# Patient Record
Sex: FEMALE | Race: WHITE | NOT HISPANIC OR LATINO
[De-identification: names, ages, dates, MRNs, and addresses within clinical notes are randomized per-mention and may not be internally consistent; named-entity substitution may affect disease eponyms.]

---

## 2022-04-19 PROBLEM — Z00.00 ENCOUNTER FOR PREVENTIVE HEALTH EXAMINATION: Status: ACTIVE | Noted: 2022-04-19

## 2022-05-04 ENCOUNTER — APPOINTMENT (OUTPATIENT)
Dept: OBGYN | Facility: CLINIC | Age: 42
End: 2022-05-04
Payer: MEDICAID

## 2022-05-04 ENCOUNTER — RESULT CHARGE (OUTPATIENT)
Age: 42
End: 2022-05-04

## 2022-05-04 VITALS — HEIGHT: 63 IN | BODY MASS INDEX: 24.8 KG/M2 | TEMPERATURE: 98 F | WEIGHT: 140 LBS

## 2022-05-04 VITALS — DIASTOLIC BLOOD PRESSURE: 60 MMHG | SYSTOLIC BLOOD PRESSURE: 104 MMHG

## 2022-05-04 DIAGNOSIS — Z78.9 OTHER SPECIFIED HEALTH STATUS: ICD-10-CM

## 2022-05-04 LAB
BILIRUB UR QL STRIP: NEGATIVE
GLUCOSE UR-MCNC: NEGATIVE
HCG UR QL: 0.2 EU/DL
HGB UR QL STRIP.AUTO: NORMAL
KETONES UR-MCNC: NORMAL
LEUKOCYTE ESTERASE UR QL STRIP: NORMAL
NITRITE UR QL STRIP: NEGATIVE
PH UR STRIP: 6
PROT UR STRIP-MCNC: NEGATIVE
SP GR UR STRIP: 1.03

## 2022-05-04 PROCEDURE — 99386 PREV VISIT NEW AGE 40-64: CPT

## 2022-05-04 NOTE — DISCUSSION/SUMMARY
[FreeTextEntry1] : 42 yo  LMP 22 (approximate) with right breast mass and small amount of discharge on exam\par - Pap and HPV collected\par - Vaginitis panel collected for patient's complaint of increased vaginal discharge\par - Patient already has breast ultrasound referral from her PCP, has appointment with regional radiology. Advised her to have screening mammogram as well, referral given.\par - Discussed contraception options, patient declined\par - Next annual in 1 year

## 2022-05-04 NOTE — PHYSICAL EXAM
[Chaperone Declined] : Patient declined chaperone [Appropriately responsive] : appropriately responsive [Alert] : alert [No Acute Distress] : no acute distress [Soft] : soft [Non-tender] : non-tender [Non-distended] : non-distended [No Lesions] : no lesions [No Mass] : no mass [Oriented x3] : oriented x3 [FreeTextEntry6] : Right breast with palpable mass at 11 oclock, 3-4 cm from her areola, poorly defined, freely mobile, nontender. Breasts otherwise normal bilaterally. [Labia Majora] : normal [Labia Minora] : normal [Scant] : scant [Discharge] : a  ~M vaginal discharge was present [White] : white [Thin] : thin [No Bleeding] : There was no active vaginal bleeding [Normal] : normal [Tenderness] : nontender [Enlarged ___ wks] : not enlarged [Anteversion] : anteverted [Uterine Adnexae] : normal

## 2022-05-04 NOTE — HISTORY OF PRESENT ILLNESS
[FreeTextEntry1] : 40 yo  LMP 22 (approximate) presents to establish gyn care and discuss right breast mass. She noticed the mass 2-3 weeks ago, top of her right breast, was tender during her last period, now is nontender and feels like it moved slightly over the last 2 weeks. She denies nipple discharge or bleeding. She has a referral for a diagnostic breast ultrasound from her PCP.\par \par She denies abdominal pain, pelvic pain, abnormal vaginal bleeding. She has noticed increased vaginal discharge over the last few weeks. She does not have vaginal itching or burning.\par \par OB:  full term  x2\par GYN: Denies h/o fibroids, cysts, abnormal paps, STIs. Last saw gyn approximately 10 years ago, had normal annual visits after her children were born. never had mammogram. Takes probiotics regularly\par PMH: Denies\par PSH: Denies\par Meds: none\par

## 2022-05-06 LAB — HPV HIGH+LOW RISK DNA PNL CVX: NOT DETECTED

## 2022-05-12 LAB — CYTOLOGY CVX/VAG DOC THIN PREP: NORMAL

## 2022-06-28 ENCOUNTER — NON-APPOINTMENT (OUTPATIENT)
Age: 42
End: 2022-06-28

## 2023-02-28 ENCOUNTER — NON-APPOINTMENT (OUTPATIENT)
Age: 43
End: 2023-02-28

## 2023-04-05 ENCOUNTER — APPOINTMENT (OUTPATIENT)
Dept: OBGYN | Facility: CLINIC | Age: 43
End: 2023-04-05
Payer: MEDICAID

## 2023-04-05 VITALS — HEIGHT: 64 IN | BODY MASS INDEX: 23.9 KG/M2 | WEIGHT: 140 LBS

## 2023-04-05 PROCEDURE — 99213 OFFICE O/P EST LOW 20 MIN: CPT

## 2023-04-05 NOTE — HISTORY OF PRESENT ILLNESS
[FreeTextEntry1] : 41 yo presents with vaginal discomfort. Reports shaving 3 days and experiencing pain immediately after. Started feeling "bumps" by her labia and was concerned. Reports bumps are smaller and less painful now. No fever, chills. Reports always having more than normal amount of clear discharge which is bothersome.

## 2023-04-05 NOTE — DISCUSSION/SUMMARY
[FreeTextEntry1] : 43 yo with small labial cyst and irritation. \par \par - f/up vagintiis\par - discussed some people have higher than average amount of physiologic discharge, nothing to do\par - as small cyst is improving per patient, nothing to do. Would recommend soaking area with warm water and avoid using feminine hygiene products and shaving in the near futur. Just wash with unscented soap and water\par - can take daily probiotic if desired\par - appt next month for annual exam

## 2023-05-10 ENCOUNTER — APPOINTMENT (OUTPATIENT)
Dept: OBGYN | Facility: CLINIC | Age: 43
End: 2023-05-10
Payer: MEDICAID

## 2023-05-10 VITALS — DIASTOLIC BLOOD PRESSURE: 70 MMHG | SYSTOLIC BLOOD PRESSURE: 110 MMHG

## 2023-05-10 VITALS — HEIGHT: 64 IN | BODY MASS INDEX: 24.24 KG/M2 | WEIGHT: 142 LBS

## 2023-05-10 DIAGNOSIS — Z87.42 PERSONAL HISTORY OF OTHER DISEASES OF THE FEMALE GENITAL TRACT: ICD-10-CM

## 2023-05-10 DIAGNOSIS — N90.89 OTHER SPECIFIED NONINFLAMMATORY DISORDERS OF VULVA AND PERINEUM: ICD-10-CM

## 2023-05-10 LAB
BILIRUB UR QL STRIP: NORMAL
GLUCOSE UR-MCNC: NORMAL
HCG UR QL: 0.2 EU/DL
HGB UR QL STRIP.AUTO: NORMAL
KETONES UR-MCNC: NORMAL
LEUKOCYTE ESTERASE UR QL STRIP: NORMAL
NITRITE UR QL STRIP: NORMAL
PH UR STRIP: 6
PROT UR STRIP-MCNC: NORMAL
SP GR UR STRIP: 1.03

## 2023-05-10 PROCEDURE — 99396 PREV VISIT EST AGE 40-64: CPT

## 2023-05-10 PROCEDURE — 81003 URINALYSIS AUTO W/O SCOPE: CPT | Mod: QW

## 2023-05-12 LAB — HPV HIGH+LOW RISK DNA PNL CVX: NOT DETECTED

## 2023-05-16 LAB — CYTOLOGY CVX/VAG DOC THIN PREP: NORMAL

## 2023-07-31 ENCOUNTER — NON-APPOINTMENT (OUTPATIENT)
Age: 43
End: 2023-07-31

## 2023-12-20 DIAGNOSIS — N39.0 URINARY TRACT INFECTION, SITE NOT SPECIFIED: ICD-10-CM

## 2023-12-20 RX ORDER — NITROFURANTOIN (MONOHYDRATE/MACROCRYSTALS) 25; 75 MG/1; MG/1
100 CAPSULE ORAL
Qty: 14 | Refills: 0 | Status: ACTIVE | COMMUNITY
Start: 2023-12-20 | End: 1900-01-01

## 2024-05-15 ENCOUNTER — APPOINTMENT (OUTPATIENT)
Dept: OBGYN | Facility: CLINIC | Age: 44
End: 2024-05-15
Payer: COMMERCIAL

## 2024-05-15 VITALS — SYSTOLIC BLOOD PRESSURE: 118 MMHG | DIASTOLIC BLOOD PRESSURE: 60 MMHG

## 2024-05-15 VITALS — BODY MASS INDEX: 24.24 KG/M2 | HEIGHT: 64 IN | WEIGHT: 142 LBS

## 2024-05-15 DIAGNOSIS — N63.0 UNSPECIFIED LUMP IN UNSPECIFIED BREAST: ICD-10-CM

## 2024-05-15 DIAGNOSIS — N39.3 STRESS INCONTINENCE (FEMALE) (MALE): ICD-10-CM

## 2024-05-15 DIAGNOSIS — Z01.419 ENCOUNTER FOR GYNECOLOGICAL EXAMINATION (GENERAL) (ROUTINE) W/OUT ABNORMAL FINDINGS: ICD-10-CM

## 2024-05-15 DIAGNOSIS — F52.0 HYPOACTIVE SEXUAL DESIRE DISORDER: ICD-10-CM

## 2024-05-15 LAB
BILIRUB UR QL STRIP: NORMAL
GLUCOSE UR-MCNC: NORMAL
HCG UR QL: 0.2 EU/DL
HGB UR QL STRIP.AUTO: NORMAL
KETONES UR-MCNC: NORMAL
LEUKOCYTE ESTERASE UR QL STRIP: NORMAL
NITRITE UR QL STRIP: NORMAL
PH UR STRIP: 5.5
PROT UR STRIP-MCNC: NORMAL
SP GR UR STRIP: 1.02

## 2024-05-15 PROCEDURE — 99396 PREV VISIT EST AGE 40-64: CPT

## 2024-05-15 PROCEDURE — 81003 URINALYSIS AUTO W/O SCOPE: CPT | Mod: QW

## 2024-05-15 RX ORDER — FLIBANSERIN 100 MG/1
100 TABLET, FILM COATED ORAL
Qty: 30 | Refills: 5 | Status: ACTIVE | COMMUNITY
Start: 2024-05-15 | End: 1900-01-01

## 2024-05-15 NOTE — HISTORY OF PRESENT ILLNESS
[FreeTextEntry1] : 44 yo  LMP 23 (yesterday) presents for annual visit. She denies breast pain, breast changes, abdominal pain, pelvic pain, abnormal vaginal bleeding, vaginal discharge, dyspareunia.  She notes leakage of urine when she coughs, sneezes, laughs. No leakage without warning. She feels it bothers her. She has not gone to physical therapy yet but is still interested in trying.  She notes decreased sexual desire for the last few years that is bothersome to her. She does not have pain or discomfort with intercourse.  OB:  vaginal delivery x2 GYN: Denies h/o fibroids, cysts, abnormal pap smears, STIs. Last pap smear 2023 NILM HPV negative. 2022 and 2023 mammograms BIRADS3, 6 month followup recommended (did not go last year or this year) PMH: Denies PSH: Denies Meds: none

## 2024-05-15 NOTE — PHYSICAL EXAM
[Appropriately responsive] : appropriately responsive [Alert] : alert [No Acute Distress] : no acute distress [Soft] : soft [Non-tender] : non-tender [Non-distended] : non-distended [No Lesions] : no lesions [No Mass] : no mass [Oriented x3] : oriented x3 [Examination Of The Breasts] : a normal appearance [No Masses] : no breast masses were palpable [Labia Majora] : normal [Labia Minora] : normal [Moderate] : There was moderate vaginal bleeding [Normal] : normal [Tenderness] : nontender [Enlarged ___ wks] : not enlarged [Anteversion] : anteverted [Mass ___ cm] : no uterine mass was palpated [Uterine Adnexae] : normal

## 2024-05-15 NOTE — DISCUSSION/SUMMARY
[FreeTextEntry1] : 44 yo  LMP 23 (yesterday) with hypoactive sexual desire disorder, stress incontinence, and normal annual exam - Pap smear and HPV collected. Moderate blood on pap smear due to menses - Screening mammogram for 2023 given and diagnostic mammogram to do now given. Encouraged to go for both, explained that they both evaluate the breasts differently. - Discussed stress incontinence. Discussed that treatment for each can include medical management or surgical management, as well as pelvic floor physical therapy. Referral for urogynecology Dr. Howard/Dr. Abdalla given for further evaluation and discussion. She would like to start with physical therapy. Referral given for [facility] at [facility] on [facility]. If no improvement advised to see urogynecology. - Discussed hypoactive sexual desire disorder, expectations going into perimenopause and eventually menopause. Discussed Addyi, she is interested in trying. Discussed risks and benefits. Sent Rx to pharmacy, advised to take regularly each night. - Next annual 1 year

## 2024-05-17 ENCOUNTER — NON-APPOINTMENT (OUTPATIENT)
Age: 44
End: 2024-05-17

## 2024-05-17 LAB
CYTOLOGY CVX/VAG DOC THIN PREP: ABNORMAL
HPV HIGH+LOW RISK DNA PNL CVX: NOT DETECTED